# Patient Record
Sex: MALE | Race: BLACK OR AFRICAN AMERICAN | NOT HISPANIC OR LATINO | Employment: UNEMPLOYED | URBAN - METROPOLITAN AREA
[De-identification: names, ages, dates, MRNs, and addresses within clinical notes are randomized per-mention and may not be internally consistent; named-entity substitution may affect disease eponyms.]

---

## 2023-10-28 ENCOUNTER — HOSPITAL ENCOUNTER (EMERGENCY)
Facility: HOSPITAL | Age: 68
Discharge: HOME | End: 2023-10-28
Payer: MEDICARE

## 2023-10-28 VITALS
BODY MASS INDEX: 24.44 KG/M2 | HEART RATE: 81 BPM | SYSTOLIC BLOOD PRESSURE: 122 MMHG | HEIGHT: 69 IN | OXYGEN SATURATION: 100 % | RESPIRATION RATE: 16 BRPM | WEIGHT: 165 LBS | TEMPERATURE: 97 F | DIASTOLIC BLOOD PRESSURE: 80 MMHG

## 2023-10-28 PROCEDURE — 99283 EMERGENCY DEPT VISIT LOW MDM: CPT

## 2023-10-28 PROCEDURE — 4500999001 HC ED NO CHARGE

## 2023-10-28 ASSESSMENT — COLUMBIA-SUICIDE SEVERITY RATING SCALE - C-SSRS
6. HAVE YOU EVER DONE ANYTHING, STARTED TO DO ANYTHING, OR PREPARED TO DO ANYTHING TO END YOUR LIFE?: NO
1. IN THE PAST MONTH, HAVE YOU WISHED YOU WERE DEAD OR WISHED YOU COULD GO TO SLEEP AND NOT WAKE UP?: NO
2. HAVE YOU ACTUALLY HAD ANY THOUGHTS OF KILLING YOURSELF?: NO

## 2023-10-28 NOTE — ED TRIAGE NOTES
Pt here for generalized pain d/t cancer and colostomy. States he drinks 6 beers a day. 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

## 2024-12-23 ENCOUNTER — HOSPITAL ENCOUNTER (EMERGENCY)
Facility: HOSPITAL | Age: 69
Discharge: HOME | End: 2024-12-23
Attending: STUDENT IN AN ORGANIZED HEALTH CARE EDUCATION/TRAINING PROGRAM
Payer: MEDICARE

## 2024-12-23 VITALS
RESPIRATION RATE: 18 BRPM | HEART RATE: 76 BPM | DIASTOLIC BLOOD PRESSURE: 76 MMHG | SYSTOLIC BLOOD PRESSURE: 132 MMHG | OXYGEN SATURATION: 98 % | TEMPERATURE: 98.5 F

## 2024-12-23 DIAGNOSIS — M79.605 CHRONIC PAIN OF BOTH LOWER EXTREMITIES: ICD-10-CM

## 2024-12-23 DIAGNOSIS — G89.29 CHRONIC PAIN OF BOTH LOWER EXTREMITIES: ICD-10-CM

## 2024-12-23 DIAGNOSIS — M79.604 CHRONIC PAIN OF BOTH LOWER EXTREMITIES: ICD-10-CM

## 2024-12-23 DIAGNOSIS — K94.00 COLOSTOMY COMPLICATION (MULTI): Primary | ICD-10-CM

## 2024-12-23 LAB
ALBUMIN SERPL BCP-MCNC: 4.2 G/DL (ref 3.4–5)
ALP SERPL-CCNC: 64 U/L (ref 33–136)
ALT SERPL W P-5'-P-CCNC: 10 U/L (ref 10–52)
ANION GAP SERPL CALC-SCNC: 12 MMOL/L (ref 10–20)
AST SERPL W P-5'-P-CCNC: 12 U/L (ref 9–39)
BASOPHILS # BLD AUTO: 0.03 X10*3/UL (ref 0–0.1)
BASOPHILS NFR BLD AUTO: 0.5 %
BILIRUB SERPL-MCNC: 0.6 MG/DL (ref 0–1.2)
BUN SERPL-MCNC: 13 MG/DL (ref 6–23)
CALCIUM SERPL-MCNC: 9.3 MG/DL (ref 8.6–10.6)
CHLORIDE SERPL-SCNC: 102 MMOL/L (ref 98–107)
CO2 SERPL-SCNC: 26 MMOL/L (ref 21–32)
CREAT SERPL-MCNC: 0.79 MG/DL (ref 0.5–1.3)
EGFRCR SERPLBLD CKD-EPI 2021: >90 ML/MIN/1.73M*2
EOSINOPHIL # BLD AUTO: 0.14 X10*3/UL (ref 0–0.7)
EOSINOPHIL NFR BLD AUTO: 2.1 %
ERYTHROCYTE [DISTWIDTH] IN BLOOD BY AUTOMATED COUNT: 13.3 % (ref 11.5–14.5)
GLUCOSE SERPL-MCNC: 97 MG/DL (ref 74–99)
HCT VFR BLD AUTO: 38.3 % (ref 41–52)
HGB BLD-MCNC: 13.3 G/DL (ref 13.5–17.5)
IMM GRANULOCYTES # BLD AUTO: 0.02 X10*3/UL (ref 0–0.7)
IMM GRANULOCYTES NFR BLD AUTO: 0.3 % (ref 0–0.9)
LYMPHOCYTES # BLD AUTO: 1.31 X10*3/UL (ref 1.2–4.8)
LYMPHOCYTES NFR BLD AUTO: 19.9 %
MAGNESIUM SERPL-MCNC: 1.98 MG/DL (ref 1.6–2.4)
MCH RBC QN AUTO: 32.9 PG (ref 26–34)
MCHC RBC AUTO-ENTMCNC: 34.7 G/DL (ref 32–36)
MCV RBC AUTO: 95 FL (ref 80–100)
MONOCYTES # BLD AUTO: 0.95 X10*3/UL (ref 0.1–1)
MONOCYTES NFR BLD AUTO: 14.4 %
NEUTROPHILS # BLD AUTO: 4.14 X10*3/UL (ref 1.2–7.7)
NEUTROPHILS NFR BLD AUTO: 62.8 %
NRBC BLD-RTO: 0 /100 WBCS (ref 0–0)
PLATELET # BLD AUTO: 291 X10*3/UL (ref 150–450)
POTASSIUM SERPL-SCNC: 3.7 MMOL/L (ref 3.5–5.3)
PROT SERPL-MCNC: 7.3 G/DL (ref 6.4–8.2)
RBC # BLD AUTO: 4.04 X10*6/UL (ref 4.5–5.9)
SODIUM SERPL-SCNC: 136 MMOL/L (ref 136–145)
WBC # BLD AUTO: 6.6 X10*3/UL (ref 4.4–11.3)

## 2024-12-23 PROCEDURE — 80053 COMPREHEN METABOLIC PANEL: CPT

## 2024-12-23 PROCEDURE — 83735 ASSAY OF MAGNESIUM: CPT

## 2024-12-23 PROCEDURE — 36415 COLL VENOUS BLD VENIPUNCTURE: CPT

## 2024-12-23 PROCEDURE — 85025 COMPLETE CBC W/AUTO DIFF WBC: CPT

## 2024-12-23 PROCEDURE — 99283 EMERGENCY DEPT VISIT LOW MDM: CPT | Performed by: STUDENT IN AN ORGANIZED HEALTH CARE EDUCATION/TRAINING PROGRAM

## 2024-12-23 PROCEDURE — 99284 EMERGENCY DEPT VISIT MOD MDM: CPT | Performed by: STUDENT IN AN ORGANIZED HEALTH CARE EDUCATION/TRAINING PROGRAM

## 2024-12-23 PROCEDURE — 2500000001 HC RX 250 WO HCPCS SELF ADMINISTERED DRUGS (ALT 637 FOR MEDICARE OP): Mod: SE

## 2024-12-23 RX ORDER — IBUPROFEN 600 MG/1
600 TABLET ORAL EVERY 6 HOURS PRN
Qty: 40 TABLET | Refills: 0 | Status: SHIPPED | OUTPATIENT
Start: 2024-12-23 | End: 2025-01-02

## 2024-12-23 RX ORDER — PETROLATUM,WHITE
1 OINTMENT IN PACKET (GRAM) TOPICAL AS NEEDED
Qty: 106 G | Refills: 0 | Status: SHIPPED | OUTPATIENT
Start: 2024-12-23 | End: 2025-01-22

## 2024-12-23 RX ORDER — ACETAMINOPHEN 500 MG
1000 TABLET ORAL EVERY 6 HOURS PRN
Qty: 30 TABLET | Refills: 0 | Status: SHIPPED | OUTPATIENT
Start: 2024-12-23 | End: 2025-01-02

## 2024-12-23 RX ORDER — ACETAMINOPHEN 325 MG/1
975 TABLET ORAL ONCE
Status: COMPLETED | OUTPATIENT
Start: 2024-12-23 | End: 2024-12-23

## 2024-12-23 RX ORDER — PETROLATUM 420 MG/G
OINTMENT TOPICAL ONCE
Status: COMPLETED | OUTPATIENT
Start: 2024-12-23 | End: 2024-12-23

## 2024-12-23 RX ADMIN — PETROLATUM: 420 OINTMENT TOPICAL at 03:47

## 2024-12-23 RX ADMIN — ACETAMINOPHEN 975 MG: 325 TABLET ORAL at 01:24

## 2024-12-23 ASSESSMENT — COLUMBIA-SUICIDE SEVERITY RATING SCALE - C-SSRS
1. IN THE PAST MONTH, HAVE YOU WISHED YOU WERE DEAD OR WISHED YOU COULD GO TO SLEEP AND NOT WAKE UP?: NO
6. HAVE YOU EVER DONE ANYTHING, STARTED TO DO ANYTHING, OR PREPARED TO DO ANYTHING TO END YOUR LIFE?: NO
2. HAVE YOU ACTUALLY HAD ANY THOUGHTS OF KILLING YOURSELF?: NO

## 2024-12-23 NOTE — ED PROVIDER NOTES
History of Present Illness   History provided by: Patient  Limitations to History: None  External Records Reviewed with Brief Summary:  Discharge summary from 2/28/2023 reviewed which shows admission for alcohol withdrawal syndrome.  Does show patient's most recent up-to-date medication list which includes Keppra as well as amlodipine and Coreg use for the patient's hypertension.    HPI:  Juancho Tate is a 69 y.o. male with a past medical history of colorectal cancer s/p loop colostomy and resection in 2018, polysubstance abuse, hypertension and neurosyphilis that presents to the emergency department today for colostomy bag issue.  The patient states that he has not had any previous issues with his colostomy site but when he was changing his dressing approximately 1-2 hours ago he noticed some blood coming from the superior aspect of his site which is never happened before.  He denies any associated pain with this and has not had any recent fevers, chills, drainage from the area, surrounding redness/swelling, chest pain, shortness of breath, or bloody stools.  He does complain of some chronic lower extremity pain that is unchanged today.    Physical Exam   Triage vitals:  T 36.9 °C (98.5 °F)  HR 76  BP (!) 133/100  RR 18  O2 98 % None (Room air)    Vital signs reviewed in nursing triage note, EMR flow sheets, and at patient's bedside.   General: Awake, alert, in no acute distress  Eyes: Gaze conjugate.  No scleral icterus or injection  HENT: Normo-cephalic, atraumatic. No stridor. No rhinorrhea or epistaxis.  CV: Regular rate and rhythm. No murmurs appreciated.  2+ radial DP pulses bilaterally.  Respiratory: Breathing non-labored, speaking in full sentences.  Lungs clear to auscultation bilaterally.  GI: Soft, non-distended, non-tender. No rebound or guarding.  Colostomy site in the left mid abdomen with scant amount of maroon blood present in the drainage bag.  Stoma site shows mild irritation over the  superior aspect with no active bleeding or obvious dehiscence.  No surrounding erythema or areas of fluctuance.  MSK/Extremities: No gross bony deformities. Moving all extremities. No lower extremity edema.  Skin: Warm. Appropriate color  Neuro: Alert. Oriented. Face symmetric. Speech is fluent.  Gross strength and sensation intact in b/l UE and LEs  Psych: Appropriate mood and affect      Medical Decision Making & ED Course   Medical Decision Makin y.o. male with the above-stated past medical history that presents to the emergency department for colostomy site bleeding.  Upon arrival to the emergency department the patient had stable vital signs, was afebrile and saturating well on room air.  History and physical exam are as above but notable for nontoxic-appearing patient in no acute distress.  Patient had a benign abdominal exam and there was a small amount of outpouching of his colostomy site which appears to be at baseline according to him.  There is a small area of irritation over the superior aspect with no active bleeding.  There is no surrounding erythema or areas of fluctuance and I have low concern for infectious etiology.  The colostomy appears to be working appropriately and there is no need for imaging at this time.  In regards to the patient's chronic leg pain, I have low suspicion for DVT or other concerning etiology as he has equal pulses bilaterally and no symptoms consistent with ischemic leg.  This is likely musculoskeletal in nature but will obtain basic labs to rule out electrolyte abnormality given his history of a colostomy.  He will be treated with a dose of Tylenol for his chronic pain and reevaluate throughout the ED course.  See ED course below for further workup and final disposition.    Differential diagnoses considered include but are not limited to: Wound dehiscence, infected colostomy site, colostomy site irritation, DVT, ischemic leg, chronic lower leg pain    ED Course:  ED  Course as of 12/23/24 0333   Mon Dec 23, 2024   0215 69-year-old male, lying in bed, no acute distress.  He is presenting to the emergency department for a trace amount of bleeding from his ostomy site.  Started tonight.  Has never had this before.  No trauma.  Bright red blood.  On exam the patient is sitting in bed in no acute distress, heart rate is regular rate and rhythm, lungs are clear to auscultation bilaterally, examination of his ostomy reveals a trace amount of irritation to the superior aspect of the ostomy, no blood coming from the actual ostomy hole or bowels.  Belly is otherwise soft and benign.  I suspect likely irritation which we will treat with Aquaphor.  Of note the patient is also complaining of bilateral lower extremity pain, primarily in the muscles of the hamstrings, quads, and calf.  Denies any swelling, denies any recent travel.  There is no evidence of edema, he is got full range of motion of the hip knee and ankle without any evidence of joint swelling.  Low suspicion for septic arthritis, no evidence of trauma.  We will check electrolytes to rule out any of these as a potential cause and treat the patient with a dose of Tylenol for potential musculoskeletal pain. [NS]   0329 Labs reviewed with no concerning abnormalities.  The patient is appropriate for discharge at this time and was provided with a prescription for Tylenol, ibuprofen and petroleum jelly which was sent to their pharmacy.  Strict return precautions were provided including significant worsening of his bleeding/erythema/drainage from his colostomy site, severe/crushing chest pain, shortness of breath on exertion, syncope, severe/sudden onset headaches or strokelike symptoms.  The patient verbalized their understanding of this and was discharged in stable condition.  He was instructed to follow-up with his primary care provider within the next 5 days for reevaluation of his symptoms. [RS]      ED Course User Index  [NS]  Clint Bland MD  [RS] Kevin Dallas, DO         Diagnoses as of 12/23/24 0333   Colostomy complication (Multi)   Chronic pain of both lower extremities        Social Determinants of Health which Significantly Impact Care: None identified     EKG Independent Interpretation: EKG not obtained    Independent Result Review and Interpretation: Relevant laboratory and radiographic results were reviewed and independently interpreted by myself.  As necessary, they are commented on in the ED Course.    Chronic conditions affecting the patient's care: As documented in the HPI    The patient was discussed with the following consultants/services: None    Care Considerations: As documented above in MDM    Disposition   As a result of the work-up, the patient was discharged home.  he was informed of his diagnosis and instructed to come back with any concerns or worsening of condition.  he and was agreeable to the plan as discussed above.  he was given the opportunity to ask questions.  All of the patient's questions were answered.    Procedures   Procedures    Patient seen and discussed with ED attending physician.    Kevin Dallas DO   Emergency Medicine, PGY-2     Disclaimer: This note was dictated by speech recognition. Minor errors in transcription may be present.     [unfilled] ? [employer] last updated 12/23/2024 12:56 AM        Kevin Dallas,   Resident  12/23/24 0338       Kevin Dallas DO  Resident  12/23/24 0338

## 2024-12-23 NOTE — ED TRIAGE NOTES
Pt came to ED by EMS from home. Pt stated he was changing his colostomy bag and noted some blood around the site. Pt is A&Ox4 and looks to be in no apparent distress at this time.

## 2024-12-23 NOTE — DISCHARGE INSTRUCTIONS
You have been diagnosed with colostomy site irritation and chronic leg pain in the emergency department today.  Your labs did not show anything concerning and you will be discharged home.  A prescription for Tylenol and ibuprofen has been sent to your pharmacy.  Please use this medication as instructed by your pharmacist.  You should follow-up with your primary care provider within 5 days for reevaluation of your symptoms..  Please return to the emergency department if you begin experiencing any significant bleeding from the ostomy site, decreased output from the ostomy site, uncontrolled fevers, surrounding swelling or redness of the ostomy site, severe crushing chest pain, shortness of breath on exertion, passout, severe/sudden onset headache, or strokelike symptoms.